# Patient Record
Sex: MALE | Race: OTHER | HISPANIC OR LATINO | URBAN - METROPOLITAN AREA
[De-identification: names, ages, dates, MRNs, and addresses within clinical notes are randomized per-mention and may not be internally consistent; named-entity substitution may affect disease eponyms.]

---

## 2018-09-16 ENCOUNTER — INPATIENT (INPATIENT)
Facility: HOSPITAL | Age: 43
LOS: 1 days | Discharge: HOME HEALTH | End: 2018-09-18
Attending: SURGERY | Admitting: SURGERY
Payer: MEDICAID

## 2018-09-16 VITALS
RESPIRATION RATE: 18 BRPM | DIASTOLIC BLOOD PRESSURE: 81 MMHG | TEMPERATURE: 98 F | OXYGEN SATURATION: 95 % | SYSTOLIC BLOOD PRESSURE: 126 MMHG | HEART RATE: 105 BPM

## 2018-09-16 DIAGNOSIS — Z28.21 IMMUNIZATION NOT CARRIED OUT BECAUSE OF PATIENT REFUSAL: ICD-10-CM

## 2018-09-16 DIAGNOSIS — K61.0 ANAL ABSCESS: ICD-10-CM

## 2018-09-16 DIAGNOSIS — L03.317 CELLULITIS OF BUTTOCK: ICD-10-CM

## 2018-09-16 LAB
ANION GAP SERPL CALC-SCNC: 13 MMOL/L — SIGNIFICANT CHANGE UP (ref 7–14)
BASE EXCESS BLDV CALC-SCNC: 4.5 MMOL/L — HIGH (ref -2–2)
BASOPHILS # BLD AUTO: 0.05 K/UL — SIGNIFICANT CHANGE UP (ref 0–0.2)
BASOPHILS NFR BLD AUTO: 0.4 % — SIGNIFICANT CHANGE UP (ref 0–1)
BUN SERPL-MCNC: 11 MG/DL — SIGNIFICANT CHANGE UP (ref 10–20)
CA-I SERPL-SCNC: 1.18 MMOL/L — SIGNIFICANT CHANGE UP (ref 1.12–1.3)
CALCIUM SERPL-MCNC: 8.6 MG/DL — SIGNIFICANT CHANGE UP (ref 8.5–10.1)
CHLORIDE SERPL-SCNC: 97 MMOL/L — LOW (ref 98–110)
CO2 SERPL-SCNC: 26 MMOL/L — SIGNIFICANT CHANGE UP (ref 17–32)
CREAT SERPL-MCNC: 0.9 MG/DL — SIGNIFICANT CHANGE UP (ref 0.7–1.5)
EOSINOPHIL # BLD AUTO: 0.13 K/UL — SIGNIFICANT CHANGE UP (ref 0–0.7)
EOSINOPHIL NFR BLD AUTO: 1 % — SIGNIFICANT CHANGE UP (ref 0–8)
GAS PNL BLDV: 139 MMOL/L — SIGNIFICANT CHANGE UP (ref 136–145)
GAS PNL BLDV: SIGNIFICANT CHANGE UP
GLUCOSE SERPL-MCNC: 98 MG/DL — SIGNIFICANT CHANGE UP (ref 70–99)
HCO3 BLDV-SCNC: 31 MMOL/L — HIGH (ref 22–29)
HCT VFR BLD CALC: 40.3 % — LOW (ref 42–52)
HCT VFR BLDA CALC: 43.1 % — SIGNIFICANT CHANGE UP (ref 34–44)
HGB BLD CALC-MCNC: 14.1 G/DL — SIGNIFICANT CHANGE UP (ref 14–18)
HGB BLD-MCNC: 13.3 G/DL — LOW (ref 14–18)
IMM GRANULOCYTES NFR BLD AUTO: 0.3 % — SIGNIFICANT CHANGE UP (ref 0.1–0.3)
INR BLD: 1.37 RATIO — HIGH (ref 0.65–1.3)
LACTATE BLDV-MCNC: 1 MMOL/L — SIGNIFICANT CHANGE UP (ref 0.5–1.6)
LYMPHOCYTES # BLD AUTO: 1.37 K/UL — SIGNIFICANT CHANGE UP (ref 1.2–3.4)
LYMPHOCYTES # BLD AUTO: 10.5 % — LOW (ref 20.5–51.1)
MCHC RBC-ENTMCNC: 29.3 PG — SIGNIFICANT CHANGE UP (ref 27–31)
MCHC RBC-ENTMCNC: 33 G/DL — SIGNIFICANT CHANGE UP (ref 32–37)
MCV RBC AUTO: 88.8 FL — SIGNIFICANT CHANGE UP (ref 80–94)
MONOCYTES # BLD AUTO: 1.14 K/UL — HIGH (ref 0.1–0.6)
MONOCYTES NFR BLD AUTO: 8.7 % — SIGNIFICANT CHANGE UP (ref 1.7–9.3)
NEUTROPHILS # BLD AUTO: 10.3 K/UL — HIGH (ref 1.4–6.5)
NEUTROPHILS NFR BLD AUTO: 79.1 % — HIGH (ref 42.2–75.2)
NRBC # BLD: 0 /100 WBCS — SIGNIFICANT CHANGE UP (ref 0–0)
PCO2 BLDV: 55 MMHG — HIGH (ref 41–51)
PH BLDV: 7.36 — SIGNIFICANT CHANGE UP (ref 7.26–7.43)
PLATELET # BLD AUTO: 195 K/UL — SIGNIFICANT CHANGE UP (ref 130–400)
PO2 BLDV: 21 MMHG — SIGNIFICANT CHANGE UP (ref 20–40)
POTASSIUM BLDV-SCNC: 4 MMOL/L — SIGNIFICANT CHANGE UP (ref 3.3–5.6)
POTASSIUM SERPL-MCNC: 4.6 MMOL/L — SIGNIFICANT CHANGE UP (ref 3.5–5)
POTASSIUM SERPL-SCNC: 4.6 MMOL/L — SIGNIFICANT CHANGE UP (ref 3.5–5)
PROTHROM AB SERPL-ACNC: 14.7 SEC — HIGH (ref 9.95–12.87)
RBC # BLD: 4.54 M/UL — LOW (ref 4.7–6.1)
RBC # FLD: 13.1 % — SIGNIFICANT CHANGE UP (ref 11.5–14.5)
SAO2 % BLDV: 30 % — SIGNIFICANT CHANGE UP
SODIUM SERPL-SCNC: 136 MMOL/L — SIGNIFICANT CHANGE UP (ref 135–146)
WBC # BLD: 13.03 K/UL — HIGH (ref 4.8–10.8)
WBC # FLD AUTO: 13.03 K/UL — HIGH (ref 4.8–10.8)

## 2018-09-16 PROCEDURE — 10060 I&D ABSCESS SIMPLE/SINGLE: CPT

## 2018-09-16 RX ORDER — IBUPROFEN 200 MG
400 TABLET ORAL EVERY 6 HOURS
Qty: 0 | Refills: 0 | Status: DISCONTINUED | OUTPATIENT
Start: 2018-09-16 | End: 2018-09-18

## 2018-09-16 RX ORDER — DOCUSATE SODIUM 100 MG
100 CAPSULE ORAL THREE TIMES A DAY
Qty: 0 | Refills: 0 | Status: DISCONTINUED | OUTPATIENT
Start: 2018-09-16 | End: 2018-09-18

## 2018-09-16 RX ORDER — MORPHINE SULFATE 50 MG/1
2 CAPSULE, EXTENDED RELEASE ORAL ONCE
Qty: 0 | Refills: 0 | Status: DISCONTINUED | OUTPATIENT
Start: 2018-09-16 | End: 2018-09-16

## 2018-09-16 RX ORDER — CEFEPIME 1 G/1
2000 INJECTION, POWDER, FOR SOLUTION INTRAMUSCULAR; INTRAVENOUS ONCE
Qty: 0 | Refills: 0 | Status: COMPLETED | OUTPATIENT
Start: 2018-09-16 | End: 2018-09-16

## 2018-09-16 RX ORDER — SODIUM CHLORIDE 9 MG/ML
1000 INJECTION INTRAMUSCULAR; INTRAVENOUS; SUBCUTANEOUS ONCE
Qty: 0 | Refills: 0 | Status: COMPLETED | OUTPATIENT
Start: 2018-09-16 | End: 2018-09-16

## 2018-09-16 RX ORDER — KETOROLAC TROMETHAMINE 30 MG/ML
15 SYRINGE (ML) INJECTION ONCE
Qty: 0 | Refills: 0 | Status: DISCONTINUED | OUTPATIENT
Start: 2018-09-16 | End: 2018-09-16

## 2018-09-16 RX ORDER — MORPHINE SULFATE 50 MG/1
2 CAPSULE, EXTENDED RELEASE ORAL EVERY 6 HOURS
Qty: 0 | Refills: 0 | Status: DISCONTINUED | OUTPATIENT
Start: 2018-09-16 | End: 2018-09-18

## 2018-09-16 RX ORDER — ACETAMINOPHEN 500 MG
650 TABLET ORAL EVERY 6 HOURS
Qty: 0 | Refills: 0 | Status: DISCONTINUED | OUTPATIENT
Start: 2018-09-16 | End: 2018-09-18

## 2018-09-16 RX ORDER — HEPARIN SODIUM 5000 [USP'U]/ML
5000 INJECTION INTRAVENOUS; SUBCUTANEOUS EVERY 8 HOURS
Qty: 0 | Refills: 0 | Status: DISCONTINUED | OUTPATIENT
Start: 2018-09-16 | End: 2018-09-18

## 2018-09-16 RX ORDER — VANCOMYCIN HCL 1 G
1000 VIAL (EA) INTRAVENOUS ONCE
Qty: 0 | Refills: 0 | Status: COMPLETED | OUTPATIENT
Start: 2018-09-16 | End: 2018-09-16

## 2018-09-16 RX ORDER — PANTOPRAZOLE SODIUM 20 MG/1
40 TABLET, DELAYED RELEASE ORAL
Qty: 0 | Refills: 0 | Status: DISCONTINUED | OUTPATIENT
Start: 2018-09-16 | End: 2018-09-18

## 2018-09-16 RX ORDER — SENNA PLUS 8.6 MG/1
2 TABLET ORAL AT BEDTIME
Qty: 0 | Refills: 0 | Status: DISCONTINUED | OUTPATIENT
Start: 2018-09-16 | End: 2018-09-18

## 2018-09-16 RX ADMIN — SODIUM CHLORIDE 2000 MILLILITER(S): 9 INJECTION INTRAMUSCULAR; INTRAVENOUS; SUBCUTANEOUS at 14:51

## 2018-09-16 RX ADMIN — CEFEPIME 100 MILLIGRAM(S): 1 INJECTION, POWDER, FOR SOLUTION INTRAMUSCULAR; INTRAVENOUS at 23:17

## 2018-09-16 RX ADMIN — Medication 250 MILLIGRAM(S): at 20:18

## 2018-09-16 RX ADMIN — MORPHINE SULFATE 2 MILLIGRAM(S): 50 CAPSULE, EXTENDED RELEASE ORAL at 14:51

## 2018-09-16 RX ADMIN — Medication 15 MILLIGRAM(S): at 19:30

## 2018-09-16 NOTE — ED PROVIDER NOTE - OBJECTIVE STATEMENT
This is a 43yoM no sig PMH (last PMD visit >1yr ago) who presents for buttock abscess x 1wk. Developed abscess and was seen by o/p surgery, started on bactrim and IM ceftriaxone. Abscess has not grown in size, has not drained anything, but fevers are ongoing.  He has pain, especially with standing, and some pain with defecation. He followed up today with surgery who sent him to the ED as he is not improving and fevers are worse.  No hx prior abscesses.

## 2018-09-16 NOTE — ED PROVIDER NOTE - PROGRESS NOTE DETAILS
I received signout from Dr. Klerman, Patient with perianal abscess, surgery has seen the patient, discussing with their attending regarding the best location to perform the drainage safely.

## 2018-09-16 NOTE — PROCEDURE NOTE - PROCEDURE
<<-----Click on this checkbox to enter Procedure Incision and drainage abscess  09/16/2018    Active  JUDY

## 2018-09-16 NOTE — ED ADULT NURSE NOTE - NSIMPLEMENTINTERV_GEN_ALL_ED
Implemented All Universal Safety Interventions:  [place] to call system. Call bell, personal items and telephone within reach. Instruct patient to call for assistance. Room bathroom lighting operational. Non-slip footwear when patient is off stretcher. Physically safe environment: no spills, clutter or unnecessary equipment. Stretcher in lowest position, wheels locked, appropriate side rails in place.

## 2018-09-16 NOTE — ED PROVIDER NOTE - NS ED ROS FT
Constitutional: + fevers/chills, no sick contacts  Eyes: No visual changes, eye pain or discharge. No photophobia  ENMT: No hearing changes, pain, discharge or infections. No sore throat or drooling.  Neck:  No neck pain or stiffness. No limited ROM  Cardiac: No SOB or edema. No chest pain with exertion.  Respiratory: No cough or respiratory distress. No hemoptysis. No history of asthma or RAD  GI: No nausea, vomiting, diarrhea or abdominal pain  : No dysuria, frequency or burning. No discharge  MS: No myalgia, muscle weakness, joint pain or back pain  Neuro: No headache or weakness. No LOC  Skin: No skin rash  Endo: no diabetes or thyroid dysfunction  Heme: no abnormal bleeding or clotting  Except as documented in the HPI, all other systems are negative

## 2018-09-16 NOTE — H&P ADULT - NSHPPHYSICALEXAM_GEN_ALL_CORE
Physical Exam: Gen: a&ox3  Lungs: clear b/l  Abd: soft, NT, ND  Perianal: cellulitis of right buttock, 6 cm fluctuance

## 2018-09-16 NOTE — H&P ADULT - NSHPLABSRESULTS_GEN_ALL_CORE
13.3   	13.03 )-----------( 195      ( 16 Sep 2018 13:53 )  	           40.3   	09-16    	136  |  97<L>  |  11  	----------------------------<  98  	4.6   |  26  |  0.9    	Ca    8.6      16 Sep 2018 13:53    	Lactate 1.0    	CT pelvis - 6*4.2 cm perirectal abscess

## 2018-09-16 NOTE — ED ADULT NURSE NOTE - OBJECTIVE STATEMENT
pt presents to ed with buttock abscess, pt was seen by outpatient surgery where he was started on abx  but abscess has not gotten any better and has been still having fevers

## 2018-09-16 NOTE — H&P ADULT - ASSESSMENT
44 yo M with perirectal abscess    Plan  I and D at bedside  Admit to surgery  Reg diet  Augmentin  Repeat CBC  VNS, dressing change tomorrow  Discussed with Dr. Campoverde, Dr. Arreola  Discussed with ED

## 2018-09-16 NOTE — ED PROVIDER NOTE - PHYSICAL EXAMINATION
CONSTITUTIONAL: well developed; well nourished; well appearing in no acute distress  HEAD: normocephalic; atraumatic  EYES: PERRL, no conjunctival injection, no scleral icterus  ENT: no nasal discharge; airway clear.  NECK: supple; non tender. + full passive ROM in all directions  CARD: S1, S2 normal; no murmurs, gallops, or rubs. Regular rate and rhythm  RESP: no wheezes, rales or rhonchi. Good air movement bilaterally without significant accessory muscle use  ABD: soft; non-distended; non-tender. No rebound, no guarding, no pulsatile abdominal mass  EXT: moving all extremities spontaneously, normal ROM. No clubbing, cyanosis or edema  SKIN: warm and dry, no lesions noted  NEURO: alert, oriented, CN II-XII grossly intact, motor and sensory grossly intact, speech nonslurred, no focal deficits. GCS 15  PSYCH: calm, cooperative, appropriate, good eye contact, logical thought process, no apparent danger to self or others CONSTITUTIONAL: well developed; well nourished; well appearing in no acute distress  HEAD: normocephalic; atraumatic  EYES: PERRL, no conjunctival injection, no scleral icterus  ENT: no nasal discharge; airway clear.  NECK: supple; non tender. + full passive ROM in all directions  CARD: S1, S2 normal; no murmurs, gallops, or rubs. Regular rate and rhythm  RESP: no wheezes, rales or rhonchi. Good air movement bilaterally without significant accessory muscle use  ABD: soft; non-distended; non-tender. No rebound, no guarding, no pulsatile abdominal mass  EXT: moving all extremities spontaneously, normal ROM. No clubbing, cyanosis or edema  RECTAL: performed in presence of chaperone, large R buttock abscess near gluteal cleft with some fluctuance, no purulent drainage into rectum but tender to palpation of rectum  SKIN: warm and dry, no lesions noted  NEURO: alert, oriented, CN II-XII grossly intact, motor and sensory grossly intact, speech nonslurred, no focal deficits. GCS 15  PSYCH: calm, cooperative, appropriate, good eye contact, logical thought process, no apparent danger to self or others

## 2018-09-16 NOTE — H&P ADULT - HISTORY OF PRESENT ILLNESS
44 yo M with 1 week of perianal abscess and cellulitis, seen by medicine 3 days ago, started on bactrim, did not have i and d. Presented with worsening of abscess. Vitals stable, no fever.    ALL: NKDA  Meds: none  PMH/PSH: none

## 2018-09-17 LAB
ALBUMIN SERPL ELPH-MCNC: 3.5 G/DL — SIGNIFICANT CHANGE UP (ref 3.5–5.2)
ALP SERPL-CCNC: 84 U/L — SIGNIFICANT CHANGE UP (ref 30–115)
ALT FLD-CCNC: 19 U/L — SIGNIFICANT CHANGE UP (ref 0–41)
ANION GAP SERPL CALC-SCNC: 12 MMOL/L — SIGNIFICANT CHANGE UP (ref 7–14)
AST SERPL-CCNC: 13 U/L — SIGNIFICANT CHANGE UP (ref 0–41)
BASOPHILS # BLD AUTO: 0.07 K/UL — SIGNIFICANT CHANGE UP (ref 0–0.2)
BASOPHILS NFR BLD AUTO: 0.5 % — SIGNIFICANT CHANGE UP (ref 0–1)
BILIRUB SERPL-MCNC: 0.5 MG/DL — SIGNIFICANT CHANGE UP (ref 0.2–1.2)
BUN SERPL-MCNC: 10 MG/DL — SIGNIFICANT CHANGE UP (ref 10–20)
CALCIUM SERPL-MCNC: 8.1 MG/DL — LOW (ref 8.5–10.1)
CHLORIDE SERPL-SCNC: 103 MMOL/L — SIGNIFICANT CHANGE UP (ref 98–110)
CO2 SERPL-SCNC: 25 MMOL/L — SIGNIFICANT CHANGE UP (ref 17–32)
CREAT SERPL-MCNC: 0.9 MG/DL — SIGNIFICANT CHANGE UP (ref 0.7–1.5)
EOSINOPHIL # BLD AUTO: 0.19 K/UL — SIGNIFICANT CHANGE UP (ref 0–0.7)
EOSINOPHIL NFR BLD AUTO: 1.3 % — SIGNIFICANT CHANGE UP (ref 0–8)
GLUCOSE SERPL-MCNC: 94 MG/DL — SIGNIFICANT CHANGE UP (ref 70–99)
HCT VFR BLD CALC: 37.7 % — LOW (ref 42–52)
HGB BLD-MCNC: 12.3 G/DL — LOW (ref 14–18)
IMM GRANULOCYTES NFR BLD AUTO: 0.5 % — HIGH (ref 0.1–0.3)
LYMPHOCYTES # BLD AUTO: 1.39 K/UL — SIGNIFICANT CHANGE UP (ref 1.2–3.4)
LYMPHOCYTES # BLD AUTO: 9.7 % — LOW (ref 20.5–51.1)
MCHC RBC-ENTMCNC: 29.4 PG — SIGNIFICANT CHANGE UP (ref 27–31)
MCHC RBC-ENTMCNC: 32.6 G/DL — SIGNIFICANT CHANGE UP (ref 32–37)
MCV RBC AUTO: 90 FL — SIGNIFICANT CHANGE UP (ref 80–94)
MONOCYTES # BLD AUTO: 1.09 K/UL — HIGH (ref 0.1–0.6)
MONOCYTES NFR BLD AUTO: 7.6 % — SIGNIFICANT CHANGE UP (ref 1.7–9.3)
NEUTROPHILS # BLD AUTO: 11.48 K/UL — HIGH (ref 1.4–6.5)
NEUTROPHILS NFR BLD AUTO: 80.4 % — HIGH (ref 42.2–75.2)
NRBC # BLD: 0 /100 WBCS — SIGNIFICANT CHANGE UP (ref 0–0)
PLATELET # BLD AUTO: 188 K/UL — SIGNIFICANT CHANGE UP (ref 130–400)
POTASSIUM SERPL-MCNC: 4.5 MMOL/L — SIGNIFICANT CHANGE UP (ref 3.5–5)
POTASSIUM SERPL-SCNC: 4.5 MMOL/L — SIGNIFICANT CHANGE UP (ref 3.5–5)
PROT SERPL-MCNC: 6.5 G/DL — SIGNIFICANT CHANGE UP (ref 6–8)
RBC # BLD: 4.19 M/UL — LOW (ref 4.7–6.1)
RBC # FLD: 13.2 % — SIGNIFICANT CHANGE UP (ref 11.5–14.5)
SODIUM SERPL-SCNC: 140 MMOL/L — SIGNIFICANT CHANGE UP (ref 135–146)
WBC # BLD: 14.29 K/UL — HIGH (ref 4.8–10.8)
WBC # FLD AUTO: 14.29 K/UL — HIGH (ref 4.8–10.8)

## 2018-09-17 RX ORDER — AMPICILLIN SODIUM AND SULBACTAM SODIUM 250; 125 MG/ML; MG/ML
3 INJECTION, POWDER, FOR SUSPENSION INTRAMUSCULAR; INTRAVENOUS ONCE
Qty: 0 | Refills: 0 | Status: COMPLETED | OUTPATIENT
Start: 2018-09-17 | End: 2018-09-17

## 2018-09-17 RX ORDER — AMPICILLIN SODIUM AND SULBACTAM SODIUM 250; 125 MG/ML; MG/ML
INJECTION, POWDER, FOR SUSPENSION INTRAMUSCULAR; INTRAVENOUS
Qty: 0 | Refills: 0 | Status: DISCONTINUED | OUTPATIENT
Start: 2018-09-17 | End: 2018-09-18

## 2018-09-17 RX ORDER — AMPICILLIN SODIUM AND SULBACTAM SODIUM 250; 125 MG/ML; MG/ML
3 INJECTION, POWDER, FOR SUSPENSION INTRAMUSCULAR; INTRAVENOUS EVERY 6 HOURS
Qty: 0 | Refills: 0 | Status: DISCONTINUED | OUTPATIENT
Start: 2018-09-17 | End: 2018-09-18

## 2018-09-17 RX ADMIN — Medication 650 MILLIGRAM(S): at 00:33

## 2018-09-17 RX ADMIN — PANTOPRAZOLE SODIUM 40 MILLIGRAM(S): 20 TABLET, DELAYED RELEASE ORAL at 05:37

## 2018-09-17 RX ADMIN — Medication 400 MILLIGRAM(S): at 12:30

## 2018-09-17 RX ADMIN — Medication 650 MILLIGRAM(S): at 00:34

## 2018-09-17 RX ADMIN — HEPARIN SODIUM 5000 UNIT(S): 5000 INJECTION INTRAVENOUS; SUBCUTANEOUS at 21:35

## 2018-09-17 RX ADMIN — Medication 400 MILLIGRAM(S): at 17:11

## 2018-09-17 RX ADMIN — Medication 650 MILLIGRAM(S): at 17:11

## 2018-09-17 RX ADMIN — HEPARIN SODIUM 5000 UNIT(S): 5000 INJECTION INTRAVENOUS; SUBCUTANEOUS at 05:37

## 2018-09-17 RX ADMIN — AMPICILLIN SODIUM AND SULBACTAM SODIUM 200 GRAM(S): 250; 125 INJECTION, POWDER, FOR SUSPENSION INTRAMUSCULAR; INTRAVENOUS at 15:56

## 2018-09-17 RX ADMIN — Medication 400 MILLIGRAM(S): at 11:44

## 2018-09-17 RX ADMIN — AMPICILLIN SODIUM AND SULBACTAM SODIUM 200 GRAM(S): 250; 125 INJECTION, POWDER, FOR SUSPENSION INTRAMUSCULAR; INTRAVENOUS at 21:35

## 2018-09-17 RX ADMIN — Medication 1 TABLET(S): at 13:33

## 2018-09-17 RX ADMIN — Medication 650 MILLIGRAM(S): at 05:37

## 2018-09-17 RX ADMIN — Medication 400 MILLIGRAM(S): at 05:37

## 2018-09-17 RX ADMIN — HEPARIN SODIUM 5000 UNIT(S): 5000 INJECTION INTRAVENOUS; SUBCUTANEOUS at 13:33

## 2018-09-17 RX ADMIN — Medication 100 MILLIGRAM(S): at 05:37

## 2018-09-17 RX ADMIN — Medication 1 TABLET(S): at 05:37

## 2018-09-17 RX ADMIN — Medication 650 MILLIGRAM(S): at 11:43

## 2018-09-17 RX ADMIN — Medication 650 MILLIGRAM(S): at 17:10

## 2018-09-17 RX ADMIN — Medication 400 MILLIGRAM(S): at 00:34

## 2018-09-17 RX ADMIN — Medication 650 MILLIGRAM(S): at 12:30

## 2018-09-17 RX ADMIN — Medication 400 MILLIGRAM(S): at 17:10

## 2018-09-17 RX ADMIN — Medication 100 MILLIGRAM(S): at 21:35

## 2018-09-17 RX ADMIN — Medication 400 MILLIGRAM(S): at 00:33

## 2018-09-17 RX ADMIN — Medication 100 MILLIGRAM(S): at 13:33

## 2018-09-17 NOTE — PROGRESS NOTE ADULT - SUBJECTIVE AND OBJECTIVE BOX
Progress Note: General Surgery  Patient: AMADA MARTINEZ , 43y (1975)Male   MRN: [id number]  Location: [address]   Visit: 09-16-18 Inpatient  Date: 09-17-18 @ 05:20    Procedure/Diagnosis: s/p bedside I&D    Events/ 24h: Bedside I&D last night drained 50cc of infected hematoma. Pain controlled.    Vitals: T(F): 98.2 (09-17-18 @ 00:00), Max: 99.2 (09-16-18 @ 15:28)  HR: 79 (09-17-18 @ 00:00)  BP: 126/56 (09-17-18 @ 00:00) (112/70 - 126/81)  RR: 18 (09-17-18 @ 00:00)  SpO2: 98% (09-17-18 @ 00:00)    In:   09-16-18 @ 07:01  -  09-17-18 @ 05:20  --------------------------------------------------------  IN: 0 mL      Out:   09-16-18 @ 07:01  -  09-17-18 @ 05:20  --------------------------------------------------------  OUT:    Voided: 400 mL  Total OUT: 400 mL        Net:   09-16-18 @ 07:01  -  09-17-18 @ 05:20  --------------------------------------------------------  NET: -400 mL        Diet: Diet, Regular (09-16-18 @ 22:37)    IV Fluids:     Physical Examination:  General Appearance: NAD  HEENT: EOMI, sclera non-icteric.  Heart: RRR   Lungs: CTABL.   Abdomen:  Dressing c/d/i. Soft, nontender, nondistended. No rigidity, guarding, or rebound tenderness.   MSK/Extremities: Warm & well-perfused. Peripheral pulses intact.  Skin: Warm, dry. No jaundice.       Medications: [Standing]  acetaminophen   Tablet .. 650 milliGRAM(s) Oral every 6 hours  amoxicillin  500 milliGRAM(s)/clavulanate 1 Tablet(s) Oral every 8 hours  docusate sodium 100 milliGRAM(s) Oral three times a day  heparin  Injectable 5000 Unit(s) SubCutaneous every 8 hours  ibuprofen  Tablet. 400 milliGRAM(s) Oral every 6 hours  pantoprazole    Tablet 40 milliGRAM(s) Oral before breakfast    DVT Prophylaxis: heparin  Injectable 5000 Unit(s) SubCutaneous every 8 hours    GI Prophylaxis: pantoprazole    Tablet 40 milliGRAM(s) Oral before breakfast    Antibiotics: amoxicillin  500 milliGRAM(s)/clavulanate 1 Tablet(s) Oral every 8 hours    Anticoagulation:   Medications:[PRN]  morphine  - Injectable 2 milliGRAM(s) IV Push every 6 hours PRN  senna 2 Tablet(s) Oral at bedtime PRN      Labs:                        12.3   14.29 )-----------( 188      ( 16 Sep 2018 23:31 )             37.7     09-16    140  |  103  |  10  ----------------------------<  94  4.5   |  25  |  0.9    Ca    8.1<L>      16 Sep 2018 23:31    TPro  6.5  /  Alb  3.5  /  TBili  0.5  /  DBili  x   /  AST  13  /  ALT  19  /  AlkPhos  84  09-16    LIVER FUNCTIONS - ( 16 Sep 2018 23:31 )  Alb: 3.5 g/dL / Pro: 6.5 g/dL / ALK PHOS: 84 U/L / ALT: 19 U/L / AST: 13 U/L / GGT: x           PT/INR - ( 16 Sep 2018 13:53 )   PT: 14.70 sec;   INR: 1.37 ratio          Imaging:     < from: CT Pelvis w/ IV Cont (09.16.18 @ 16:18) >  Right-sided perirectal abscess, measuring approximately 4.2 x 6.0 cm.    < end of copied text >      Assessment:  43y Male patient admitted S/P bedside I&D    Plan:    Abx  Dressing change today  f/u SW for VNS  OOBAT  IS  Pain control    Date/Time: 09-17-18 @ 05:20

## 2018-09-17 NOTE — PATIENT PROFILE ADULT. - LANGUAGE ASSISTANCE NEEDED
No-Patient/Caregiver offered and refused free interpretation services./pt does not want  phone- pt is able to make needs known in English

## 2018-09-18 ENCOUNTER — TRANSCRIPTION ENCOUNTER (OUTPATIENT)
Age: 43
End: 2018-09-18

## 2018-09-18 VITALS
SYSTOLIC BLOOD PRESSURE: 121 MMHG | RESPIRATION RATE: 18 BRPM | TEMPERATURE: 98 F | DIASTOLIC BLOOD PRESSURE: 51 MMHG | HEART RATE: 69 BPM

## 2018-09-18 LAB
ANION GAP SERPL CALC-SCNC: 9 MMOL/L — SIGNIFICANT CHANGE UP (ref 7–14)
BASOPHILS # BLD AUTO: 0.07 K/UL — SIGNIFICANT CHANGE UP (ref 0–0.2)
BASOPHILS NFR BLD AUTO: 0.9 % — SIGNIFICANT CHANGE UP (ref 0–1)
BUN SERPL-MCNC: 12 MG/DL — SIGNIFICANT CHANGE UP (ref 10–20)
CALCIUM SERPL-MCNC: 8.1 MG/DL — LOW (ref 8.5–10.1)
CHLORIDE SERPL-SCNC: 103 MMOL/L — SIGNIFICANT CHANGE UP (ref 98–110)
CO2 SERPL-SCNC: 26 MMOL/L — SIGNIFICANT CHANGE UP (ref 17–32)
CREAT SERPL-MCNC: 0.6 MG/DL — LOW (ref 0.7–1.5)
EOSINOPHIL # BLD AUTO: 0.44 K/UL — SIGNIFICANT CHANGE UP (ref 0–0.7)
EOSINOPHIL NFR BLD AUTO: 5.9 % — SIGNIFICANT CHANGE UP (ref 0–8)
GLUCOSE SERPL-MCNC: 109 MG/DL — HIGH (ref 70–99)
HCT VFR BLD CALC: 36.8 % — LOW (ref 42–52)
HGB BLD-MCNC: 11.9 G/DL — LOW (ref 14–18)
IMM GRANULOCYTES NFR BLD AUTO: 0.3 % — SIGNIFICANT CHANGE UP (ref 0.1–0.3)
LYMPHOCYTES # BLD AUTO: 1.6 K/UL — SIGNIFICANT CHANGE UP (ref 1.2–3.4)
LYMPHOCYTES # BLD AUTO: 21.3 % — SIGNIFICANT CHANGE UP (ref 20.5–51.1)
MAGNESIUM SERPL-MCNC: 2.4 MG/DL — SIGNIFICANT CHANGE UP (ref 1.8–2.4)
MCHC RBC-ENTMCNC: 29.1 PG — SIGNIFICANT CHANGE UP (ref 27–31)
MCHC RBC-ENTMCNC: 32.3 G/DL — SIGNIFICANT CHANGE UP (ref 32–37)
MCV RBC AUTO: 90 FL — SIGNIFICANT CHANGE UP (ref 80–94)
MONOCYTES # BLD AUTO: 0.57 K/UL — SIGNIFICANT CHANGE UP (ref 0.1–0.6)
MONOCYTES NFR BLD AUTO: 7.6 % — SIGNIFICANT CHANGE UP (ref 1.7–9.3)
NEUTROPHILS # BLD AUTO: 4.8 K/UL — SIGNIFICANT CHANGE UP (ref 1.4–6.5)
NEUTROPHILS NFR BLD AUTO: 64 % — SIGNIFICANT CHANGE UP (ref 42.2–75.2)
NRBC # BLD: 0 /100 WBCS — SIGNIFICANT CHANGE UP (ref 0–0)
PHOSPHATE SERPL-MCNC: 2.9 MG/DL — SIGNIFICANT CHANGE UP (ref 2.1–4.9)
PLATELET # BLD AUTO: 173 K/UL — SIGNIFICANT CHANGE UP (ref 130–400)
POTASSIUM SERPL-MCNC: 4.8 MMOL/L — SIGNIFICANT CHANGE UP (ref 3.5–5)
POTASSIUM SERPL-SCNC: 4.8 MMOL/L — SIGNIFICANT CHANGE UP (ref 3.5–5)
RBC # BLD: 4.09 M/UL — LOW (ref 4.7–6.1)
RBC # FLD: 12.9 % — SIGNIFICANT CHANGE UP (ref 11.5–14.5)
SODIUM SERPL-SCNC: 138 MMOL/L — SIGNIFICANT CHANGE UP (ref 135–146)
WBC # BLD: 7.5 K/UL — SIGNIFICANT CHANGE UP (ref 4.8–10.8)
WBC # FLD AUTO: 7.5 K/UL — SIGNIFICANT CHANGE UP (ref 4.8–10.8)

## 2018-09-18 RX ORDER — IBUPROFEN 200 MG
1 TABLET ORAL
Qty: 0 | Refills: 0 | COMMUNITY
Start: 2018-09-18

## 2018-09-18 RX ORDER — METRONIDAZOLE 500 MG
1 TABLET ORAL
Qty: 21 | Refills: 0 | OUTPATIENT
Start: 2018-09-18 | End: 2018-09-24

## 2018-09-18 RX ORDER — ACETAMINOPHEN 500 MG
2 TABLET ORAL
Qty: 0 | Refills: 0 | COMMUNITY
Start: 2018-09-18

## 2018-09-18 RX ORDER — MOXIFLOXACIN HYDROCHLORIDE TABLETS, 400 MG 400 MG/1
1 TABLET, FILM COATED ORAL
Qty: 14 | Refills: 0 | OUTPATIENT
Start: 2018-09-18 | End: 2018-09-24

## 2018-09-18 RX ADMIN — Medication 650 MILLIGRAM(S): at 00:09

## 2018-09-18 RX ADMIN — Medication 650 MILLIGRAM(S): at 06:03

## 2018-09-18 RX ADMIN — PANTOPRAZOLE SODIUM 40 MILLIGRAM(S): 20 TABLET, DELAYED RELEASE ORAL at 06:02

## 2018-09-18 RX ADMIN — AMPICILLIN SODIUM AND SULBACTAM SODIUM 200 GRAM(S): 250; 125 INJECTION, POWDER, FOR SUSPENSION INTRAMUSCULAR; INTRAVENOUS at 03:55

## 2018-09-18 RX ADMIN — Medication 400 MILLIGRAM(S): at 00:08

## 2018-09-18 RX ADMIN — AMPICILLIN SODIUM AND SULBACTAM SODIUM 200 GRAM(S): 250; 125 INJECTION, POWDER, FOR SUSPENSION INTRAMUSCULAR; INTRAVENOUS at 09:38

## 2018-09-18 RX ADMIN — Medication 100 MILLIGRAM(S): at 06:02

## 2018-09-18 RX ADMIN — Medication 400 MILLIGRAM(S): at 00:09

## 2018-09-18 RX ADMIN — Medication 400 MILLIGRAM(S): at 06:02

## 2018-09-18 RX ADMIN — Medication 400 MILLIGRAM(S): at 06:03

## 2018-09-18 RX ADMIN — Medication 650 MILLIGRAM(S): at 06:02

## 2018-09-18 RX ADMIN — HEPARIN SODIUM 5000 UNIT(S): 5000 INJECTION INTRAVENOUS; SUBCUTANEOUS at 06:02

## 2018-09-18 NOTE — DISCHARGE NOTE ADULT - PLAN OF CARE
complete recovery s/p incision and drainage at bedside. Patient stable   Plan:  VNS for dressing change arranged for home  Patient will take cipro/flagyl for 1 week and f/u with Dr Arreola on Thursday 9/20

## 2018-09-18 NOTE — DISCHARGE NOTE ADULT - ADDITIONAL INSTRUCTIONS
FOLLOW UP:  1. Follow up with Dr Arreola on Thursday 9/20. Call office for appointment. call [phone]  2. Follow up with your Primary MD within 1 week  - Keep wound clean and dry.   - If experience fever, chest pain, shortness of breath, dizziness, vomiting , bleeding or drainage from wound call Primary MD or return to ED  - Take tylenol and motrin as needed for pain. FOLLOW UP:  1. Follow up with Dr. Arreola on Thursday 9/20. Call office for appointment. call [phone]  2. Follow up with your Primary MD within 1 week  - Keep wound clean and dry.   - If experience fever, chest pain, shortness of breath, dizziness, vomiting , bleeding or drainage from wound call Primary MD or return to ED  - Take tylenol and motrin as needed for pain. FOLLOW UP:  1. Follow up with Dr. Arreola on Thursday 9/20. Call office for appointment. call [phone]  2. Follow up with your Primary MD within 1 week  - Keep wound clean and dry. The visiting nurse will come every day to change the dressing  - If experience fever, chest pain, shortness of breath, dizziness, vomiting , bleeding or drainage from wound call Primary MD or return to ED  - Take Tylenol and Motrin as needed for pain.

## 2018-09-18 NOTE — PROGRESS NOTE ADULT - SUBJECTIVE AND OBJECTIVE BOX
Progress Note: General Surgery  Patient: AMADA MARTINEZ , 43y (1975)Male   MRN: [id number]  Location: [address]  Visit: 09-16-18 Inpatient  Date: 09-18-18 @ 00:55    Procedure/Diagnosis: perirectal abscess s/p I&D    Events/ 24h: No acute events overnight. Pain controlled.    Vitals: T(F): 98.4 (09-18-18 @ 00:00), Max: 98.4 (09-18-18 @ 00:00)  HR: 55 (09-18-18 @ 00:00)  BP: 116/66 (09-18-18 @ 00:00) (97/53 - 116/66)  RR: 18 (09-18-18 @ 00:00)  SpO2: --    In:   09-16-18 @ 07:01  -  09-17-18 @ 07:00  --------------------------------------------------------  IN: 0 mL    09-17-18 @ 07:01  -  09-18-18 @ 00:55  --------------------------------------------------------  IN: 940 mL      Out:   09-16-18 @ 07:01  -  09-17-18 @ 07:00  --------------------------------------------------------  OUT:    Voided: 600 mL  Total OUT: 600 mL      09-17-18 @ 07:01  -  09-18-18 @ 00:55  --------------------------------------------------------  OUT:    Voided: 1250 mL  Total OUT: 1250 mL        Net:   09-16-18 @ 07:01  -  09-17-18 @ 07:00  --------------------------------------------------------  NET: -600 mL    09-17-18 @ 07:01  -  09-18-18 @ 00:55  --------------------------------------------------------  NET: -310 mL        Diet: Diet, Regular (09-16-18 @ 22:37)    IV Fluids:     Physical Examination:  General Appearance: NAD  HEENT: EOMI, sclera non-icteric.  Heart: RRR   Lungs: CTABL.   Abdomen:  Soft, nontender, nondistended. No rigidity, guarding, or rebound tenderness.   Buttocks: Dressing c/d/i. Packed with packing strip, to be changed on rounds this AM.   MSK/Extremities: Warm & well-perfused. Peripheral pulses intact.  Skin: Warm, dry. No jaundice.       Medications: [Standing]  acetaminophen   Tablet .. 650 milliGRAM(s) Oral every 6 hours  ampicillin/sulbactam  IVPB      ampicillin/sulbactam  IVPB 3 Gram(s) IV Intermittent every 6 hours  docusate sodium 100 milliGRAM(s) Oral three times a day  heparin  Injectable 5000 Unit(s) SubCutaneous every 8 hours  ibuprofen  Tablet. 400 milliGRAM(s) Oral every 6 hours  pantoprazole    Tablet 40 milliGRAM(s) Oral before breakfast    DVT Prophylaxis: heparin  Injectable 5000 Unit(s) SubCutaneous every 8 hours    GI Prophylaxis: pantoprazole    Tablet 40 milliGRAM(s) Oral before breakfast    Antibiotics: ampicillin/sulbactam  IVPB      ampicillin/sulbactam  IVPB 3 Gram(s) IV Intermittent every 6 hours    Anticoagulation:   Medications:[PRN]  morphine  - Injectable 2 milliGRAM(s) IV Push every 6 hours PRN  senna 2 Tablet(s) Oral at bedtime PRN      Labs:                        12.3   14.29 )-----------( 188      ( 16 Sep 2018 23:31 )             37.7     09-16    140  |  103  |  10  ----------------------------<  94  4.5   |  25  |  0.9    Ca    8.1<L>      16 Sep 2018 23:31    TPro  6.5  /  Alb  3.5  /  TBili  0.5  /  DBili  x   /  AST  13  /  ALT  19  /  AlkPhos  84  09-16    LIVER FUNCTIONS - ( 16 Sep 2018 23:31 )  Alb: 3.5 g/dL / Pro: 6.5 g/dL / ALK PHOS: 84 U/L / ALT: 19 U/L / AST: 13 U/L / GGT: x           PT/INR - ( 16 Sep 2018 13:53 )   PT: 14.70 sec;   INR: 1.37 ratio         Urine/Micro:    Culture - Blood (collected 16 Sep 2018 13:53)  Source: .Blood Blood-Venous  Preliminary Report (17 Sep 2018 22:01):    No growth to date.    Culture - Blood (collected 16 Sep 2018 13:53)  Source: .Blood Blood-Venous  Preliminary Report (17 Sep 2018 22:01):    No growth to date.      Imaging:     < from: CT Pelvis w/ IV Cont (09.16.18 @ 16:18) >    Right-sided perirectal abscess, measuring approximately 4.2 x 6.0 cm.    < end of copied text >      Assessment:  43y Male patient admitted S/P perirectal abscess I&D    Plan:    Daily dressing changes  OOBAT  IS  DVT/GI ppx  Reg diet, IVL  Pain control    Date/Time: 09-18-18 @ 00:55

## 2018-09-18 NOTE — DISCHARGE NOTE ADULT - HOSPITAL COURSE
44 yo M presented with 1 week of perianal abscess and cellulitis, was seen by medicine 3 days before, and was started on bactrim, did not have i and d. Presented with worsening of abscess.   Patient had bedside incision and drainage of Perianal abscess on 9/16 and he was started on unasyn.   On 9/18 patient doing well Vital signs stable , afebrile. pt has no complaints.  pt tolerated Po diet and ambulated.  per trauma/surgery team pt discharged home in stable condition  Visiting nursing service was arranged by the  for wound care.  Patient is being discharged on 1 week of cipro/flagyl and will follow up with Dr Rome in 1 week. 42 yo M presented with 1 week of perianal abscess and cellulitis, was seen by medicine 3 days before, and was started on bactrim, did not have i and d. Presented with worsening of abscess.   Patient had bedside incision and drainage of Perianal abscess on 9/16 and he was started on unasyn.   On 9/18 patient doing well Vital signs stable , afebrile. pt has no complaints.  pt tolerated Po diet and ambulated.  per trauma/surgery team pt discharged home in stable condition  Visiting nursing service was arranged by the  for wound care.  Patient is being discharged on 1 week of cipro/flagyl and will follow up with Dr Rome on Thursday 9/20 for wound check 42 yo M presented with 1 week of perianal abscess and cellulitis, was seen by medicine 3 days before, and was started on bactrim, did not have i and d. Presented with worsening of abscess.   Patient had bedside incision and drainage of Perianal abscess on 9/16 and he was started on unasyn. Blood cultures where sent and showed no growth to date  On 9/18 patient doing well Vital signs stable , afebrile. pt has no complaints.  pt tolerated Po diet and ambulated.  per trauma/surgery team pt discharged home in stable condition.   Visiting nursing service was arranged by the  for wound care.  Patient is being discharged on 1 week of cipro/flagyl and will follow up with Dr Arreola on Thursday 9/20 for wound check 42 y/o M presented with 1 week of perianal abscess and cellulitis, was seen by medicine 3 days before, and was started on bactrim, did not have i and d. Presented with worsening of abscess.   Patient had bedside incision and drainage of Perianal abscess on 9/16 and he was started on unasyn. Blood cultures where sent and on 9/18th showed no growth to date  On 9/18 patient doing well Vital signs stable , afebrile. pt has no complaints.  pt tolerated Po diet and ambulated.  per trauma/surgery team pt discharged home in stable condition.   Visiting nursing service was arranged by the  for wound care.  Patient is being discharged on 1 week of cipro/flagyl and will follow up with Dr Arreola on Thursday 9/20 for wound check 42 y/o M presented with 1 week of perianal abscess and cellulitis, was seen by medicine 3 days before, and was started on bactrim, did not have i and d. Presented with worsening of abscess.   Patient had bedside incision and drainage of Perianal abscess on 9/16 and he was started on unasyn. Blood cultures where sent and on 9/18th showed no growth to date  On 9/18 patient doing well Vital signs stable , afebrile. pt has no complaints.  pt tolerated Po diet and ambulated. He is passing gas and had a bowel movement. pain is well controled  per trauma/surgery team pt discharged home in stable condition.   Visiting nursing service was arranged by the  for wound care.  Patient is being discharged on 1 week of cipro/flagyl and will follow up with Dr Arreola on Thursday 9/20 for wound check

## 2018-09-18 NOTE — DISCHARGE NOTE ADULT - CARE PROVIDER_API CALL
Inocencio Arreola), Surgery  [address] [place] [address]  [address] [place] [address]  Phone: [phone]  Fax: [phone]

## 2018-09-18 NOTE — DISCHARGE NOTE ADULT - MEDICATION SUMMARY - MEDICATIONS TO TAKE
I will START or STAY ON the medications listed below when I get home from the hospital:    Flagyl 500 mg oral tablet  -- 1 tab(s) by mouth 3 times a day   -- Do not drink alcoholic beverages when taking this medication.  Finish all this medication unless otherwise directed by prescriber.  May discolor urine or feces.    -- Indication: For Perianal abscess    acetaminophen 325 mg oral tablet  -- 2 tab(s) by mouth every 6 hours, As Needed  -- Indication: For Perianal abscess    ibuprofen 400 mg oral tablet  -- 1 tab(s) by mouth every 6 hours, As Needed  -- Indication: For Perianal abscess    Cipro 500 mg oral tablet  -- 1 tab(s) by mouth 2 times a day   -- Avoid prolonged or excessive exposure to direct and/or artificial sunlight while taking this medication.  Check with your doctor before becoming pregnant.  Do not take dairy products, antacids, or iron preparations within one hour of this medication.  Finish all this medication unless otherwise directed by prescriber.  Medication should be taken with plenty of water.    -- Indication: For Perianal abscess

## 2018-09-18 NOTE — DISCHARGE NOTE ADULT - PATIENT PORTAL LINK FT
You can access the [facility], offered by [facility], by registering with the following website: [email or web address]

## 2018-09-18 NOTE — DISCHARGE NOTE ADULT - CARE PLAN
Principal Discharge DX:	Perianal abscess Principal Discharge DX:	Perianal abscess  Goal:	complete recovery  Assessment and plan of treatment:	s/p incision and drainage at bedside. Patient stable   Plan:  VNS for dressing change arranged for home  Patient will take cipro/flagyl for 1 week and f/u with Dr Arreola on Thursday 9/20

## 2018-09-19 LAB
-  AMIKACIN: SIGNIFICANT CHANGE UP
-  AMOXICILLIN/CLAVULANIC ACID: SIGNIFICANT CHANGE UP
-  AMPICILLIN/SULBACTAM: SIGNIFICANT CHANGE UP
-  AMPICILLIN: SIGNIFICANT CHANGE UP
-  AZTREONAM: SIGNIFICANT CHANGE UP
-  CEFAZOLIN: SIGNIFICANT CHANGE UP
-  CEFEPIME: SIGNIFICANT CHANGE UP
-  CEFOXITIN: SIGNIFICANT CHANGE UP
-  CEFTRIAXONE: SIGNIFICANT CHANGE UP
-  CIPROFLOXACIN: SIGNIFICANT CHANGE UP
-  ERTAPENEM: SIGNIFICANT CHANGE UP
-  GENTAMICIN: SIGNIFICANT CHANGE UP
-  IMIPENEM: SIGNIFICANT CHANGE UP
-  LEVOFLOXACIN: SIGNIFICANT CHANGE UP
-  MEROPENEM: SIGNIFICANT CHANGE UP
-  PIPERACILLIN/TAZOBACTAM: SIGNIFICANT CHANGE UP
-  TOBRAMYCIN: SIGNIFICANT CHANGE UP
-  TRIMETHOPRIM/SULFAMETHOXAZOLE: SIGNIFICANT CHANGE UP
METHOD TYPE: SIGNIFICANT CHANGE UP

## 2018-09-20 ENCOUNTER — APPOINTMENT (OUTPATIENT)
Dept: SURGERY | Facility: CLINIC | Age: 43
End: 2018-09-20
Payer: SUBSIDIZED

## 2018-09-20 PROCEDURE — 99024 POSTOP FOLLOW-UP VISIT: CPT

## 2018-09-21 LAB
CULTURE RESULTS: SIGNIFICANT CHANGE UP
ORGANISM # SPEC MICROSCOPIC CNT: SIGNIFICANT CHANGE UP
ORGANISM # SPEC MICROSCOPIC CNT: SIGNIFICANT CHANGE UP
SPECIMEN SOURCE: SIGNIFICANT CHANGE UP

## 2018-09-27 ENCOUNTER — APPOINTMENT (OUTPATIENT)
Dept: SURGERY | Facility: CLINIC | Age: 43
End: 2018-09-27
Payer: SUBSIDIZED

## 2018-09-27 VITALS
SYSTOLIC BLOOD PRESSURE: 134 MMHG | DIASTOLIC BLOOD PRESSURE: 78 MMHG | BODY MASS INDEX: 44.12 KG/M2 | HEIGHT: 63 IN | WEIGHT: 249 LBS

## 2018-09-27 PROCEDURE — 99211 OFF/OP EST MAY X REQ PHY/QHP: CPT

## 2021-10-01 ENCOUNTER — APPOINTMENT (OUTPATIENT)
Dept: GASTROENTEROLOGY | Facility: CLINIC | Age: 46
End: 2021-10-01

## 2022-05-06 ENCOUNTER — NON-APPOINTMENT (OUTPATIENT)
Age: 47
End: 2022-05-06

## 2022-05-06 ENCOUNTER — OUTPATIENT (OUTPATIENT)
Dept: OUTPATIENT SERVICES | Facility: HOSPITAL | Age: 47
LOS: 1 days | Discharge: HOME | End: 2022-05-06

## 2022-05-06 ENCOUNTER — APPOINTMENT (OUTPATIENT)
Dept: GASTROENTEROLOGY | Facility: CLINIC | Age: 47
End: 2022-05-06
Payer: COMMERCIAL

## 2022-05-06 VITALS
SYSTOLIC BLOOD PRESSURE: 149 MMHG | HEART RATE: 60 BPM | TEMPERATURE: 97 F | BODY MASS INDEX: 44.12 KG/M2 | DIASTOLIC BLOOD PRESSURE: 109 MMHG | WEIGHT: 249 LBS | HEIGHT: 63 IN | OXYGEN SATURATION: 98 %

## 2022-05-06 DIAGNOSIS — Z87.891 PERSONAL HISTORY OF NICOTINE DEPENDENCE: ICD-10-CM

## 2022-05-06 DIAGNOSIS — Z72.89 OTHER PROBLEMS RELATED TO LIFESTYLE: ICD-10-CM

## 2022-05-06 DIAGNOSIS — E11.9 TYPE 2 DIABETES MELLITUS W/OUT COMPLICATIONS: ICD-10-CM

## 2022-05-06 PROCEDURE — 99204 OFFICE O/P NEW MOD 45 MIN: CPT | Mod: GC

## 2022-05-06 RX ORDER — HYDROCORTISONE ACETATE, IODOQUINOL 19; 10 MG/G; MG/G
1-1.9 CREAM TOPICAL
Qty: 1 | Refills: 0 | Status: COMPLETED | COMMUNITY
Start: 2022-05-06 | End: 2022-05-06

## 2022-05-06 RX ORDER — POLYETHYLENE GLYCOL 3350 AND ELECTROLYTES WITH LEMON FLAVOR 236; 22.74; 6.74; 5.86; 2.97 G/4L; G/4L; G/4L; G/4L; G/4L
236 POWDER, FOR SOLUTION ORAL
Qty: 1 | Refills: 0 | Status: ACTIVE | COMMUNITY
Start: 2022-05-06 | End: 1900-01-01

## 2022-05-06 NOTE — REVIEW OF SYSTEMS
[Fever] : no fever [Chills] : no chills [Feeling Poorly] : not feeling poorly [Feeling Tired] : not feeling tired [Recent Weight Gain (___ Lbs)] : no recent weight gain [Recent Weight Loss (___ Lbs)] : no recent weight loss [Heart Rate Is Slow] : the heart rate was not slow [Heart Rate Is Fast] : the heart rate was not fast [Chest Pain] : no chest pain [Palpitations] : no palpitations [Leg Claudication] : no intermittent leg claudication [Lower Ext Edema] : no extremity edema [Shortness Of Breath] : no shortness of breath [Wheezing] : no wheezing [Cough] : no cough [SOB on Exertion] : no shortness of breath during exertion [Orthopnea] : no orthopnea [PND] : no PND [Abdominal Pain] : no abdominal pain [Vomiting] : no vomiting [Constipation] : no constipation [Diarrhea] : no diarrhea [Heartburn] : no heartburn [Melena] : no melena

## 2022-05-06 NOTE — PHYSICAL EXAM
[General Appearance - Alert] : alert [General Appearance - In No Acute Distress] : in no acute distress [General Appearance - Well Nourished] : well nourished [General Appearance - Well Developed] : well developed [General Appearance - Well-Appearing] : healthy appearing [Respiration, Rhythm And Depth] : normal respiratory rhythm and effort [Exaggerated Use Of Accessory Muscles For Inspiration] : no accessory muscle use [Auscultation Breath Sounds / Voice Sounds] : lungs were clear to auscultation bilaterally [Chest Palpation] : palpation of the chest revealed no abnormalities [Lungs Percussion] : the lungs were normal to percussion [Apical Impulse] : the apical impulse was normal [Heart Rate And Rhythm] : heart rate was normal and rhythm regular [Bowel Sounds] : normal bowel sounds [Abdomen Soft] : soft [Abdomen Tenderness] : non-tender [] : no hepato-splenomegaly [Abdomen Mass (___ Cm)] : no abdominal mass palpated [Abdomen Hernia] : no hernia was discovered

## 2022-05-06 NOTE — HISTORY OF PRESENT ILLNESS
[Heartburn] : denies heartburn [Nausea] : denies nausea [Vomiting] : denies vomiting [Diarrhea] : denies diarrhea [Constipation] : denies constipation [Yellow Skin Or Eyes (Jaundice)] : denies jaundice [Abdominal Pain] : denies abdominal pain [Abdominal Swelling] : denies abdominal swelling [Rectal Pain] : denies rectal pain [Wt Gain ___ Lbs] : no recent weight gain [Wt Loss ___ Lbs] : no recent weight loss [GERD] : no gastroesophageal reflux disease [Inflammatory Bowel Disease] : no inflammatory bowel disease [Irritable Bowel Syndrome] : no irritable bowel syndrome [Diverticulitis] : no diverticulitis [de-identified] : This is a 46 y/o M with PMHx of DM, Perianal abscess s/p I&D presents for screening colonoscopy. Patient has been experiencing intermittent bleeding from the Abscess drainage site. Patient denies any abdominal pain, n/v/d/c. Patient denies heartburn, dysphagia, odynophagia. Patient denies hematemesis, hematochezia, or melena. \par Patient never had EGD or colonoscopy in the past. Manish any Hx of Inflammatory bowl disease. Patient denies any family history for CA.

## 2022-05-13 RX ORDER — HYDROCORTISONE AND IODOQUINOL 10; 10 MG/G; MG/G
1-1 CREAM TOPICAL TWICE DAILY
Qty: 1 | Refills: 0 | Status: ACTIVE | COMMUNITY
Start: 2022-05-13 | End: 1900-01-01

## 2022-05-18 DIAGNOSIS — Z87.891 PERSONAL HISTORY OF NICOTINE DEPENDENCE: ICD-10-CM

## 2022-05-18 DIAGNOSIS — E11.9 TYPE 2 DIABETES MELLITUS WITHOUT COMPLICATIONS: ICD-10-CM

## 2022-05-18 DIAGNOSIS — K62.5 HEMORRHAGE OF ANUS AND RECTUM: ICD-10-CM

## 2022-05-18 DIAGNOSIS — Z00.00 ENCOUNTER FOR GENERAL ADULT MEDICAL EXAMINATION WITHOUT ABNORMAL FINDINGS: ICD-10-CM

## 2022-05-18 DIAGNOSIS — K61.0 ANAL ABSCESS: ICD-10-CM

## 2022-05-18 DIAGNOSIS — Z72.89 OTHER PROBLEMS RELATED TO LIFESTYLE: ICD-10-CM

## 2022-05-18 DIAGNOSIS — Z12.11 ENCOUNTER FOR SCREENING FOR MALIGNANT NEOPLASM OF COLON: ICD-10-CM

## 2022-07-14 NOTE — H&P ADULT - NSHPPOAPULMEMBOLUS_GEN_A_CORE
Chief complaint: Ankle Injury (Lt DOI 7/14/2022)      Jerry Lopez, is a 12 year old female who presents today for a left ankle injury that happened today when she jumped over a natalya.  She twisted her ankle and immediately had pain.    I have reviewed the past medical history, family history, social history, medications and allergies listed in the medical record as obtained by my nursing staff and support staff and agree with their documentation.    REVIEW OF SYSTEMS    Constitutional: Patient denies fever, chills or tiredness.  GI: Denies abdominal pain, nausea, vomiting.  Musculoskeletal: Denies back pain, neck pain.  Integument: Denies rash or itching.      PHYSICAL EXAM:  Visit Vitals  /70   Pulse 98   Temp 98.2 °F (36.8 °C) (Temporal)   Resp 14   Wt 63.5 kg (140 lb) Comment: Wt per pt   LMP 06/21/2022   SpO2 97%     General:  Pleasant, well appearing in no acute distress.  Alert and orientated times three.   Neck: Supple and without palpable thyroid masses/nodularity/tenderness.  No adenopathy.  Cardiovascular:  Regular rate and rhythm, no murmur.  No lower extremity edema or varicosities. Pulses 2+.  Chest:  Clear to auscultation bilaterally without wheezes, rales or rhonchi.  Respiratory effort within normal limits.   Abdomen:  Soft, non distended without palpable tenderness, hepatosplenomegaly,  hernias, or masses.  Bowel sounds present and normal.   Musculoskeletal:   L Foot & ankle: Diffuse lateral ankle  swelling. Full and symmetric range of motion. Strength 5/5 for dorsiflexion, plantarflexion, inversion, eversion. Lateral ankle pain. Squeeze & external rotation tests - negative. Lateral Malleolus bony tenderness. Ligaments stable with negative anterior drawer and talar tilt tests. Neurovascularly - capillary refill time is intact,  skin warm and dry  Skin:  Warm and dry without lesions, rashes or jaundice.   Neurologic: Gait is normal.       Orders Placed This Encounter   • CRUTCHES,  UNDERARM, NOT WOOD   • ACE BANDAGE 3 OR 4 INCH PER YD   • XR Ankle 3+ View Left   • SERVICE TO ORTHOPEDICS       ASSESSMENT & PLAN  Injury of left ankle, initial encounter  - XR ANKLE 3+ VW LEFT; Future  - SERVICE TO ORTHOPEDICS  - CRUTCHES, UNDERARM, NOT WOOD  - ACE BANDAGE 3 OR 4 INCH PER YD    Salter-Walter type II physeal fracture of distal end of right fibula, initial encounter  I called Ortho Dr Rosenbaum and he was able to see a pateint today.  I reviewed xray of ankle with Mom who is a .  We wrapped her ankle with an ace wrap, gave her crutches and her Mom was going to drive her to [facility].    Follow up in clinic or urgent care if symptoms persist or worsen.    Ruchi Guardado, NP   no

## 2022-08-19 ENCOUNTER — NON-APPOINTMENT (OUTPATIENT)
Age: 47
End: 2022-08-19

## 2022-08-19 ENCOUNTER — APPOINTMENT (OUTPATIENT)
Dept: GASTROENTEROLOGY | Facility: CLINIC | Age: 47
End: 2022-08-19

## 2022-08-19 ENCOUNTER — OUTPATIENT (OUTPATIENT)
Dept: OUTPATIENT SERVICES | Facility: HOSPITAL | Age: 47
LOS: 1 days | Discharge: HOME | End: 2022-08-19

## 2022-08-19 VITALS
HEART RATE: 61 BPM | BODY MASS INDEX: 45 KG/M2 | TEMPERATURE: 96.8 F | WEIGHT: 254 LBS | OXYGEN SATURATION: 97 % | SYSTOLIC BLOOD PRESSURE: 169 MMHG | DIASTOLIC BLOOD PRESSURE: 112 MMHG | HEIGHT: 63 IN

## 2022-08-19 DIAGNOSIS — Z12.11 ENCOUNTER FOR SCREENING FOR MALIGNANT NEOPLASM OF COLON: ICD-10-CM

## 2022-08-19 DIAGNOSIS — K61.0 ANAL ABSCESS: ICD-10-CM

## 2022-08-19 DIAGNOSIS — Z00.00 ENCOUNTER FOR GENERAL ADULT MEDICAL EXAMINATION W/OUT ABNORMAL FINDINGS: ICD-10-CM

## 2022-08-19 DIAGNOSIS — Z87.19 PERSONAL HISTORY OF OTHER DISEASES OF THE DIGESTIVE SYSTEM: ICD-10-CM

## 2022-08-19 LAB
ALBUMIN SERPL ELPH-MCNC: 4.3 G/DL
ALP BLD-CCNC: 104 U/L
ALT SERPL-CCNC: 17 U/L
ANION GAP SERPL CALC-SCNC: 10 MMOL/L
AST SERPL-CCNC: 17 U/L
BASOPHILS # BLD AUTO: 0.06 K/UL
BASOPHILS NFR BLD AUTO: 0.7 %
BILIRUB SERPL-MCNC: 0.3 MG/DL
BUN SERPL-MCNC: 10 MG/DL
CALCIUM SERPL-MCNC: 8.7 MG/DL
CHLORIDE SERPL-SCNC: 103 MMOL/L
CO2 SERPL-SCNC: 29 MMOL/L
CREAT SERPL-MCNC: 0.8 MG/DL
EGFR: 108 ML/MIN/1.73M2
EOSINOPHIL # BLD AUTO: 0.18 K/UL
EOSINOPHIL NFR BLD AUTO: 2.1 %
GLUCOSE SERPL-MCNC: 95 MG/DL
HCT VFR BLD CALC: 45.2 %
HGB BLD-MCNC: 14.4 G/DL
IMM GRANULOCYTES NFR BLD AUTO: 0.4 %
INR PPP: 0.99 RATIO
LYMPHOCYTES # BLD AUTO: 2.05 K/UL
LYMPHOCYTES NFR BLD AUTO: 24.3 %
MAN DIFF?: NORMAL
MCHC RBC-ENTMCNC: 29 PG
MCHC RBC-ENTMCNC: 31.9 G/DL
MCV RBC AUTO: 91.1 FL
MONOCYTES # BLD AUTO: 0.59 K/UL
MONOCYTES NFR BLD AUTO: 7 %
NEUTROPHILS # BLD AUTO: 5.51 K/UL
NEUTROPHILS NFR BLD AUTO: 65.5 %
PLATELET # BLD AUTO: 198 K/UL
POTASSIUM SERPL-SCNC: 4.7 MMOL/L
PROT SERPL-MCNC: 6.9 G/DL
PT BLD: 11.4 SEC
RBC # BLD: 4.96 M/UL
RBC # FLD: 14.3 %
SODIUM SERPL-SCNC: 142 MMOL/L
WBC # FLD AUTO: 8.42 K/UL

## 2022-08-19 PROCEDURE — 99213 OFFICE O/P EST LOW 20 MIN: CPT | Mod: GC

## 2022-08-19 RX ORDER — POLYETHYLENE GLYCOL 3350 AND ELECTROLYTES WITH LEMON FLAVOR 236; 22.74; 6.74; 5.86; 2.97 G/4L; G/4L; G/4L; G/4L; G/4L
236 POWDER, FOR SOLUTION ORAL
Qty: 1 | Refills: 0 | Status: ACTIVE | COMMUNITY
Start: 2022-08-19 | End: 1900-01-01

## 2022-08-19 NOTE — END OF VISIT
[] : Fellow [FreeTextEntry3] : I edited the note. Agree w/ above assessment & plan.\par Hx of prei-anal abscess but pt has no hx of chronic GI symptoms (diarrhea, abd pain) or fam hx of IBD to concern for underlying IBD. \par Proceed with screening colonoscopy; preprocedural labs as above.

## 2022-08-19 NOTE — REASON FOR VISIT
[Follow-Up: _____] : a [unfilled] follow-up visit [[employer] ] : provided by [employer]   [Interpreters_IDNumber] : [id number] [TWNoteComboBox1] : Lao

## 2022-08-19 NOTE — PHYSICAL EXAM
[General Appearance - Alert] : alert [General Appearance - In No Acute Distress] : in no acute distress [Sclera] : the sclera and conjunctiva were normal [Hearing Threshold Finger Rub Not [place]] : hearing was normal [] : no respiratory distress [Exaggerated Use Of Accessory Muscles For Inspiration] : no accessory muscle use [Auscultation Breath Sounds / Voice Sounds] : lungs were clear to auscultation bilaterally [Heart Rate And Rhythm] : heart rate was normal and rhythm regular [Heart Sounds] : normal S1 and S2 [Edema] : there was no peripheral edema [Bowel Sounds] : normal bowel sounds [Abdomen Soft] : soft [Abdomen Tenderness] : non-tender [Abnormal Walk] : normal gait [Oriented To Time, Place, And Person] : oriented to person, place, and time [FreeTextEntry1] :  no scarring was noted correlating to previous I & D, no abscess was noted.  [No Focal Deficits] : no focal deficits

## 2022-08-19 NOTE — ASSESSMENT
[FreeTextEntry1] : 48 y/o M with PMHx of DM, Perianal abscess s/p I&D in 2018 presents for screening colonoscopy.  Was seen in may and scheduled for colonoscopy in June but he missed appointment due to not feeling well. Here for schedule for another colonoscopy. \par \par #)h/o perirectal abscess s/p I & D in 2018 resolved- Examined the perirectal area no scarring was noted correlating to previous I & D, no abscess was noted. \par -no h/o IBD  and also no family h/o IBD\par #)Screening colonoscopy average risk\par -ASA class II\par -No blood thinners\par -Colonoscopy prep Golytely and dulcolax ordered and provided instructions to the patient clearly, risks and benefits explained\par -Schedule colonoscopy\par \par

## 2022-08-19 NOTE — HISTORY OF PRESENT ILLNESS
[___ Month(s) Ago] : [unfilled] month(s) ago [Ordering Test(s) ___] : ordering [unfilled] [None] : had no significant interval events [Heartburn] : denies heartburn [Nausea] : denies nausea [Vomiting] : denies vomiting [Diarrhea] : denies diarrhea [Constipation] : denies constipation [Yellow Skin Or Eyes (Jaundice)] : denies jaundice [Abdominal Pain] : denies abdominal pain [Abdominal Swelling] : denies abdominal swelling [Rectal Pain] : denies rectal pain [de-identified] : 48 y/o M with PMHx of DM, Perianal abscess s/p I&D in 2018 presents for screening colonoscopy.  Was seen in may and scheduled for colonoscopy in June but he missed appointment due to not feeling well. Here for schedule for another colonoscopy. \par Occasional alcohol, no smoking or drugs \par Never had EGD and colonoscopy \par Denies dysphagia, odynophagia, weight loss, hematemesis, melena, hematochezia, nausea, vomiting, family h/o GI cancers\par

## 2022-08-26 DIAGNOSIS — K61.0 ANAL ABSCESS: ICD-10-CM

## 2022-08-26 DIAGNOSIS — Z00.00 ENCOUNTER FOR GENERAL ADULT MEDICAL EXAMINATION WITHOUT ABNORMAL FINDINGS: ICD-10-CM

## 2022-08-26 DIAGNOSIS — K62.5 HEMORRHAGE OF ANUS AND RECTUM: ICD-10-CM

## 2022-08-26 DIAGNOSIS — Z12.11 ENCOUNTER FOR SCREENING FOR MALIGNANT NEOPLASM OF COLON: ICD-10-CM

## 2022-09-01 NOTE — ED PROVIDER NOTE - DISPOSITION TYPE
ADMIT O-Z Plasty Text: The defect edges were debeveled with a #15 scalpel blade.  Given the location of the defect, shape of the defect and the proximity to free margins an O-Z plasty (double transposition flap) was deemed most appropriate.  Using a sterile surgical marker, the appropriate transposition flaps were drawn incorporating the defect and placing the expected incisions within the relaxed skin tension lines where possible.    The area thus outlined was incised deep to adipose tissue with a #15 scalpel blade.  The skin margins were undermined to an appropriate distance in all directions utilizing iris scissors.  Hemostasis was achieved with electrocautery.  The flaps were then transposed into place, one clockwise and the other counterclockwise, and anchored with interrupted buried subcutaneous sutures.

## 2022-12-06 NOTE — DISCHARGE NOTE ADULT - NS AS DC FU INST LIST INST
From: Serenity GARCIA  To: Sharlene Celis  Sent: 11/14/2022 1:30 PM CST  Subject: Results    Sharlene,    Blood sugar well controlled   A little blood in urine otherwise negative    no

## 2023-01-25 ENCOUNTER — EMERGENCY (EMERGENCY)
Facility: HOSPITAL | Age: 48
LOS: 0 days | Discharge: HOME | End: 2023-01-25
Attending: EMERGENCY MEDICINE | Admitting: EMERGENCY MEDICINE
Payer: SELF-PAY

## 2023-01-25 VITALS
HEART RATE: 87 BPM | DIASTOLIC BLOOD PRESSURE: 79 MMHG | OXYGEN SATURATION: 96 % | SYSTOLIC BLOOD PRESSURE: 149 MMHG | TEMPERATURE: 97 F | RESPIRATION RATE: 18 BRPM

## 2023-01-25 DIAGNOSIS — R09.81 NASAL CONGESTION: ICD-10-CM

## 2023-01-25 DIAGNOSIS — Z20.822 CONTACT WITH AND (SUSPECTED) EXPOSURE TO COVID-19: ICD-10-CM

## 2023-01-25 DIAGNOSIS — E11.9 TYPE 2 DIABETES MELLITUS WITHOUT COMPLICATIONS: ICD-10-CM

## 2023-01-25 DIAGNOSIS — D72.829 ELEVATED WHITE BLOOD CELL COUNT, UNSPECIFIED: ICD-10-CM

## 2023-01-25 DIAGNOSIS — R05.1 ACUTE COUGH: ICD-10-CM

## 2023-01-25 DIAGNOSIS — J18.9 PNEUMONIA, UNSPECIFIED ORGANISM: ICD-10-CM

## 2023-01-25 LAB
ALBUMIN SERPL ELPH-MCNC: 4 G/DL — SIGNIFICANT CHANGE UP (ref 3.5–5.2)
ALP SERPL-CCNC: 81 U/L — SIGNIFICANT CHANGE UP (ref 30–115)
ALT FLD-CCNC: 13 U/L — SIGNIFICANT CHANGE UP (ref 0–41)
ANION GAP SERPL CALC-SCNC: 11 MMOL/L — SIGNIFICANT CHANGE UP (ref 7–14)
AST SERPL-CCNC: 25 U/L — SIGNIFICANT CHANGE UP (ref 0–41)
BASE EXCESS BLDV CALC-SCNC: 6 MMOL/L — HIGH (ref -2–3)
BASOPHILS # BLD AUTO: 0.05 K/UL — SIGNIFICANT CHANGE UP (ref 0–0.2)
BASOPHILS NFR BLD AUTO: 0.4 % — SIGNIFICANT CHANGE UP (ref 0–1)
BILIRUB SERPL-MCNC: 0.3 MG/DL — SIGNIFICANT CHANGE UP (ref 0.2–1.2)
BUN SERPL-MCNC: 9 MG/DL — LOW (ref 10–20)
CA-I SERPL-SCNC: 1.23 MMOL/L — SIGNIFICANT CHANGE UP (ref 1.15–1.33)
CALCIUM SERPL-MCNC: 9.4 MG/DL — SIGNIFICANT CHANGE UP (ref 8.4–10.5)
CHLORIDE SERPL-SCNC: 104 MMOL/L — SIGNIFICANT CHANGE UP (ref 98–110)
CO2 SERPL-SCNC: 28 MMOL/L — SIGNIFICANT CHANGE UP (ref 17–32)
CREAT SERPL-MCNC: 0.7 MG/DL — SIGNIFICANT CHANGE UP (ref 0.7–1.5)
EGFR: 113 ML/MIN/1.73M2 — SIGNIFICANT CHANGE UP
EOSINOPHIL # BLD AUTO: 0.07 K/UL — SIGNIFICANT CHANGE UP (ref 0–0.7)
EOSINOPHIL NFR BLD AUTO: 0.6 % — SIGNIFICANT CHANGE UP (ref 0–8)
FLUAV AG NPH QL: SIGNIFICANT CHANGE UP
FLUBV AG NPH QL: SIGNIFICANT CHANGE UP
GAS PNL BLDV: 139 MMOL/L — SIGNIFICANT CHANGE UP (ref 136–145)
GAS PNL BLDV: SIGNIFICANT CHANGE UP
GAS PNL BLDV: SIGNIFICANT CHANGE UP
GLUCOSE SERPL-MCNC: 106 MG/DL — HIGH (ref 70–99)
HCO3 BLDV-SCNC: 34 MMOL/L — HIGH (ref 22–29)
HCT VFR BLD CALC: 41.6 % — LOW (ref 42–52)
HCT VFR BLDA CALC: 42 % — SIGNIFICANT CHANGE UP (ref 39–51)
HGB BLD CALC-MCNC: 14.1 G/DL — SIGNIFICANT CHANGE UP (ref 12.6–17.4)
HGB BLD-MCNC: 14.1 G/DL — SIGNIFICANT CHANGE UP (ref 14–18)
IMM GRANULOCYTES NFR BLD AUTO: 0.2 % — SIGNIFICANT CHANGE UP (ref 0.1–0.3)
LACTATE BLDV-MCNC: 1.5 MMOL/L — SIGNIFICANT CHANGE UP (ref 0.5–2)
LYMPHOCYTES # BLD AUTO: 2.52 K/UL — SIGNIFICANT CHANGE UP (ref 1.2–3.4)
LYMPHOCYTES # BLD AUTO: 20.5 % — SIGNIFICANT CHANGE UP (ref 20.5–51.1)
MAGNESIUM SERPL-MCNC: 2.3 MG/DL — SIGNIFICANT CHANGE UP (ref 1.8–2.4)
MCHC RBC-ENTMCNC: 30.3 PG — SIGNIFICANT CHANGE UP (ref 27–31)
MCHC RBC-ENTMCNC: 33.9 G/DL — SIGNIFICANT CHANGE UP (ref 32–37)
MCV RBC AUTO: 89.5 FL — SIGNIFICANT CHANGE UP (ref 80–94)
MONOCYTES # BLD AUTO: 0.83 K/UL — HIGH (ref 0.1–0.6)
MONOCYTES NFR BLD AUTO: 6.7 % — SIGNIFICANT CHANGE UP (ref 1.7–9.3)
NEUTROPHILS # BLD AUTO: 8.81 K/UL — HIGH (ref 1.4–6.5)
NEUTROPHILS NFR BLD AUTO: 71.6 % — SIGNIFICANT CHANGE UP (ref 42.2–75.2)
NRBC # BLD: 0 /100 WBCS — SIGNIFICANT CHANGE UP (ref 0–0)
NT-PROBNP SERPL-SCNC: 137 PG/ML — SIGNIFICANT CHANGE UP (ref 0–300)
PCO2 BLDV: 61 MMHG — HIGH (ref 42–55)
PH BLDV: 7.35 — SIGNIFICANT CHANGE UP (ref 7.32–7.43)
PLATELET # BLD AUTO: 189 K/UL — SIGNIFICANT CHANGE UP (ref 130–400)
PO2 BLDV: 27 MMHG — SIGNIFICANT CHANGE UP
POTASSIUM BLDV-SCNC: 3.9 MMOL/L — SIGNIFICANT CHANGE UP (ref 3.5–5.1)
POTASSIUM SERPL-MCNC: 5.3 MMOL/L — HIGH (ref 3.5–5)
POTASSIUM SERPL-SCNC: 5.3 MMOL/L — HIGH (ref 3.5–5)
PROT SERPL-MCNC: 6.8 G/DL — SIGNIFICANT CHANGE UP (ref 6–8)
RBC # BLD: 4.65 M/UL — LOW (ref 4.7–6.1)
RBC # FLD: 13.5 % — SIGNIFICANT CHANGE UP (ref 11.5–14.5)
RSV RNA NPH QL NAA+NON-PROBE: SIGNIFICANT CHANGE UP
SAO2 % BLDV: 37.2 % — SIGNIFICANT CHANGE UP
SARS-COV-2 RNA SPEC QL NAA+PROBE: SIGNIFICANT CHANGE UP
SODIUM SERPL-SCNC: 143 MMOL/L — SIGNIFICANT CHANGE UP (ref 135–146)
TROPONIN T SERPL-MCNC: <0.01 NG/ML — SIGNIFICANT CHANGE UP
WBC # BLD: 12.31 K/UL — HIGH (ref 4.8–10.8)
WBC # FLD AUTO: 12.31 K/UL — HIGH (ref 4.8–10.8)

## 2023-01-25 PROCEDURE — 99284 EMERGENCY DEPT VISIT MOD MDM: CPT

## 2023-01-25 PROCEDURE — 93010 ELECTROCARDIOGRAM REPORT: CPT

## 2023-01-25 PROCEDURE — 71046 X-RAY EXAM CHEST 2 VIEWS: CPT | Mod: 26

## 2023-01-25 RX ORDER — AZITHROMYCIN 500 MG/1
1 TABLET, FILM COATED ORAL
Qty: 1 | Refills: 0
Start: 2023-01-25 | End: 2023-01-29

## 2023-01-25 NOTE — ED PROVIDER NOTE - PROGRESS NOTE DETAILS
Dr. Martinez: cxr concerning for fluid overload vs multifocal pneumonia. will do blood work Fredrick walked patient and pt's oxygen saturation >94%

## 2023-01-25 NOTE — ED PROVIDER NOTE - ATTENDING APP SHARED VISIT CONTRIBUTION OF CARE
47-year-old male with past medical history of diabetes presents emergency department for cough since Friday.  Patient states he has been coughing and had subjective fever on Sunday but then resolved.  No shortness of breath no chest pain no nausea vomiting diarrhea abdominal pain.    Const: NAD  Eyes: PERRL, no conjunctival injection  HENT:  Neck supple without meningismus   CV: RRR, Warm, well-perfused extremities  RESP: CTA B/L, no tachypnea   GI: soft, non-tender, non-distended  MSK: No gross deformities appreciated  Skin: Warm, dry. No rashes  Neuro: Alert, CNs II-XII grossly intact. Sensation and motor function of extremities grossly intact.  Psych: Appropriate mood and affect.    flu/covid swab and cxr

## 2023-01-25 NOTE — ED PROVIDER NOTE - NSFOLLOWUPINSTRUCTIONS_ED_ALL_ED_FT
Cough    Coughing is a reflex that clears your throat and your airways. Coughing helps to heal and protect your lungs. It is normal to cough occasionally, but a cough that happens with other symptoms or lasts a long time may be a sign of a condition that needs treatment. Coughing may be caused by infections, asthma or COPD, smoking, postnasal drip, gastroesophageal reflux, as well as other medical conditions. Take medicines only as instructed by your health care provider. Avoid anything that causes you to cough at work or at home including smoking.    SEEK IMMEDIATE MEDICAL CARE IF YOU HAVE THE FOLLOWING SYMPTOMS: coughing up blood, shortness of breath, rapid heart rate, chest pain, unexplained weight loss or night sweats. Neumonía extrahospitalaria en los adultos    Community-Acquired Pneumonia, Adult      La neumonía es aditya infección pulmonar que causa inflamación y la acumulación de mucosidad y líquido en los pulmones. [place] puede causar tos y dificultad para respirar. La neumonía extrahospitalaria es aquella que se desarrolla en personas que no están, ni arambula estado recientemente, en un hospital u otro centro de atención médica.    Por lo general, la neumonía se desarrolla nelson resultado de aditya enfermedad causada por un virus, nelson el resfrío común y la gripe (influenza). También puede ser causada por bacterias u hongos. Mientras que el resfrío y la gripe pueden transmitirse de aditya persona a otra (son contagiosos), la neumonía en sí no se considera contagiosa.      ¿Cuáles son las causas?     Esta afección puede ser causada por lo siguiente:  •Virus.      •Bacterias.      •Hongos, nelson el moho o las setas.        ¿Qué incrementa el riesgo?    Los siguientes factores pueden hacer que sea más propenso a desarrollar esta afección:•Tener ciertas afecciones, por ejemplo:  •Aditya enfermedad a marcell plazo (crónica), que puede incluir enfermedad pulmonar obstructiva crónica (EPOC), asma, insuficiencia cardíaca, fibrosis quística, diabetes, enfermedad renal, anemia drepanocítica y virus de inmunodeficiencia humana (VIH).      •Aditya afección que incrementa el riesgo de respirar (aspirar) mucosidad y otros líquidos por la boca o la nariz.      •Sistema de defensa del organismo (sistema inmunitario) debilitado.        •Que le hayan extirpado el bazo (esplenectomía). El bazo es el órgano que ayuda a combatir microbios e infecciones.      •No limpiarse franko los dientes y las encías (higiene dental deficiente).      •Usar productos que contienen tabaco.      •Viajar a lugares donde hay microbios que causan neumonía.      •Estar cerca de ciertos animales o de hábitats de animales que tienen microbios que causan neumonía.      •Ser mayor de 65 años.        ¿Cuáles son los signos o síntomas?    Los síntomas de esta afección incluyen:  •Tos seca o húmeda (productiva).      •Fiebre.      •Sudoración o escalofríos.      •Dolor en el pecho; en especial, al respirar profundamente o toser.      •Respiración rápida, dificultad para respirar o falta de aire.      •Cansancio (fatiga).      •Rashmi musculares.        ¿Cómo se diagnostica?     Esta afección se puede diagnosticar mediante juanjose antecedentes médicos y un examen físico. También pueden hacerle estudios, que incluyen los siguientes:  •Radiografías de tórax.      •Pruebas del nivel de oxígeno y otros gases en la nehal.    •Pruebas de lo siguiente:  •La nehal.      •La mucosidad de los pulmones (esputo).      •El líquido alrededor de los pulmones (líquido pleural).      •La orina.        Si la neumonía es grave, se pueden realizar otros estudios para obtener más información sobre la causa.      ¿Cómo se trata?    El tratamiento de esta afección depende de muchos factores; por ejemplo, la causa de la neumonía, los medicamentos que michelle y otras afecciones que tenga.    En la mayoría de los adultos, la neumonía puede tratarse en casa. En algunos casos, el tratamiento debe realizarse en un hospital y puede incluir lo siguiente:•Medicamentos que se administran por boca (por vía oral) o a través de aditya vía intravenosa (IV), nelson los siguientes:  •Antibióticos, si la neumonía fue causada por aditya bacteria.      •Medicamentos que isha a los virus (medicamentos antivirales), si un virus causó la neumonía.        •Oxigenoterapia.      La neumonía grave, aunque es poco frecuente, puede requerir los siguientes tratamientos:  •Ventilación mecánica. En allison procedimiento, se utiliza aditya máquina para ayudarlo a respirar si no puede respirar franko por juanjose propios medios o mantener un nivel seguro de oxígeno en la nehal.      •Toracocentesis. Allison procedimiento elimina la acumulación de líquido pleural para ayudar a la respiración.        Siga estas instrucciones en hand casa:     Medicamentos     •[place] los medicamentos de venta pastor y los recetados solamente nelson se lo haya indicado el médico.      •Use medicamentos para la tos solamente si tiene dificultad para dormir. Los medicamentos para la tos pueden impedir que el cuerpo elimine la mucosidad de los pulmones.      •Si le recetaron un antibiótico, tómelo nelson se lo haya indicado el médico. No deje de consuelo el antibiótico aunque comience a sentirse mejor.        Estilo de jeffrey                   • No kendy alcohol.      • No consuma ningún producto que contenga nicotina o tabaco, nelson cigarrillos, cigarrillos electrónicos y tabaco de mascar. Si necesita ayuda para dejar de consumir estos productos, consulte al médico.      •Siga aditya dieta saludable. Esta debe incluir muchas verduras, frutas, cereales integrales, productos lácteos bajos en grasa y proteínas magras.      Instrucciones generales     •Descanse mucho y duerma nelson mínimo 8 horas todas las noches.      •De noche, duerma en posición parcialmente erguida. Coloque algunas almohadas debajo de la sally o duerma en aditya silla reclinable.      •Retome juanjose actividades normales según lo indicado por el médico. Pregúntele al médico qué actividades son seguras para usted.      •Kendy suficiente líquido nelson para mantener la orina de color amarillo pálido. [place] ayuda a diluir la mucosidad de los pulmones.      •Si tiene dolor de garganta, apryl gárgaras con aditya mezcla de agua y sal 3 o 4 veces al día, o cuando sea necesario. Para preparar la mezcla de agua con sal, disuelva totalmente de ½ a 1 cucharadita (de 3 a 6 g) de sal en 1 taza (237 ml) de agua tibia.      •Concurra a todas las visitas de seguimiento nelson se lo haya indicado el médico. [place] es importante.        ¿Cómo se previene?    Puede disminuir el riesgo de contraer neumonía extrahospitalaria con las siguientes medidas:•Vacunarse contra la neumonía. Hay diferentes tipos y esquemas de vacunas contra la neumonía. Pregúntele al médico cuál es la opción más adecuada para usted. Considere la posibilidad de aplicarse la vacuna contra la neumonía si usted:  •Es mayor de 65 años.      •Tiene entre 19 y 65 años y está en tratamiento para el cáncer, tiene aditya enfermedad pulmonar crónica o tiene otras afecciones que afectan el sistema inmunitario. Pregúntele al médico si esto se aplica en hand pablo.        •Colocarse la vacuna contra la gripe todos los años. Pregúntele al médico cuál es el tipo de vacuna más adecuado para usted.      •Realizarse controles dentales regulares.      •Lavarse las elizabeth frecuentemente con agua y jabón alicia al menos 20 segundos. Use desinfectante para elizabeth si no dispone de agua y jabón.        Comuníquese con un médico si tiene:    •Fiebre.      •Dificultad para dormir porque no puede controlar la tos con medicamentos.        Solicite ayuda de inmediato si:    •La falta de aire empeora.      •El dolor torácico aumenta.      •La enfermedad empeora, especialmente si usted es un adulto mayor o hand sistema inmunitario es débil.      •Tose y escupe nehal.      Estos síntomas pueden representar un problema grave que constituye aditya emergencia. No espere a amina si los síntomas desaparecen. Solicite atención médica de inmediato. Comuníquese con el servicio de emergencias de hand localidad (911 en los [place]). No conduzca por juanjose propios medios hasta el hospital.       Resumen    •La neumonía es aditya infección en los pulmones.      •La neumonía extrahospitalaria se desarrolla en personas que no arambula estado en el hospital. Puede ser causada por bacterias, virus u hongos.      •Esta afección puede tratarse con antibióticos o medicamentos antivirales.      •La neumonía grave puede requerir hospitalización y tratamiento para ayudar a la respiración.      Esta información no tiene nelson fin reemplazar el consejo del médico. Asegúrese de hacerle al médico cualquier pregunta que tenga.      Document Revised: 12/31/2020 Document Reviewed: 12/31/2020    Elsevier Patient Education © 2022 [employer]

## 2023-01-25 NOTE — ED PROVIDER NOTE - CLINICAL SUMMARY MEDICAL DECISION MAKING FREE TEXT BOX
47-year-old male presented to the emergency department for cough.  Patient had x-ray which demonstrated multifocal pneumonia.  Patient had labs which history of elevated white blood cell count but otherwise within normal limits.  Walked patient in the emergency department oxygen saturation remained above 94%.  Patient given antibiotics and discharged home with strict return precautions if he becomes short of breath vomiting or any fevers.  Patient understands DC home.

## 2023-01-25 NOTE — ED PROVIDER NOTE - PATIENT PORTAL LINK FT
You can access the FollowMyHealth Patient Portal offered by [facility] by registering at the following website: [email or web address]. By joining [employer]’s FollowMyHealth portal, you will also be able to view your health information using other applications (apps) compatible with our system.

## 2023-01-25 NOTE — ED PROVIDER NOTE - OBJECTIVE STATEMENT
48 yo M with pmhx of DM presenting for evaluation of cough and congestion over the last few days. Symptoms are moderate. No alleviating/aggravating factors. No cp, sob, fever, chills, abdominal pain, nausea, vomiting, diarrhea, back pain, urinary symptoms, headache, dizziness, paresthesias, or weakness.

## 2024-08-19 ENCOUNTER — OUTPATIENT (OUTPATIENT)
Dept: OUTPATIENT SERVICES | Facility: HOSPITAL | Age: 49
LOS: 1 days | End: 2024-08-19
Payer: COMMERCIAL

## 2024-08-19 DIAGNOSIS — M54.9 DORSALGIA, UNSPECIFIED: ICD-10-CM

## 2024-08-19 PROCEDURE — 72072 X-RAY EXAM THORAC SPINE 3VWS: CPT

## 2024-08-19 PROCEDURE — 72072 X-RAY EXAM THORAC SPINE 3VWS: CPT | Mod: 26

## 2024-08-20 DIAGNOSIS — M54.9 DORSALGIA, UNSPECIFIED: ICD-10-CM
